# Patient Record
Sex: MALE | Race: WHITE | NOT HISPANIC OR LATINO | URBAN - METROPOLITAN AREA
[De-identification: names, ages, dates, MRNs, and addresses within clinical notes are randomized per-mention and may not be internally consistent; named-entity substitution may affect disease eponyms.]

---

## 2018-03-28 ENCOUNTER — EMERGENCY (EMERGENCY)
Facility: HOSPITAL | Age: 63
LOS: 1 days | Discharge: HOME | End: 2018-03-28
Attending: EMERGENCY MEDICINE
Payer: SELF-PAY

## 2018-03-28 VITALS
SYSTOLIC BLOOD PRESSURE: 129 MMHG | DIASTOLIC BLOOD PRESSURE: 69 MMHG | HEIGHT: 70 IN | TEMPERATURE: 98 F | HEART RATE: 72 BPM | OXYGEN SATURATION: 97 % | WEIGHT: 179.9 LBS | RESPIRATION RATE: 15 BRPM

## 2018-03-28 PROCEDURE — 99053 MED SERV 10PM-8AM 24 HR FAC: CPT

## 2018-03-28 PROCEDURE — 99285 EMERGENCY DEPT VISIT HI MDM: CPT

## 2018-03-28 PROCEDURE — 99284 EMERGENCY DEPT VISIT MOD MDM: CPT | Mod: 25

## 2018-03-28 RX ORDER — HALOPERIDOL DECANOATE 100 MG/ML
5 INJECTION INTRAMUSCULAR ONCE
Qty: 0 | Refills: 0 | Status: COMPLETED | OUTPATIENT
Start: 2018-03-28 | End: 2018-03-28

## 2018-03-28 NOTE — ED ADULT NURSE NOTE - ED STAT RN HANDOFF DETAILS
Pt endorsed to ALEXANDER Read in stable conditions. Pt not in any acute distress. Pt awaiting disposition.

## 2018-03-28 NOTE — ED ADULT NURSE NOTE - OBJECTIVE STATEMENT
Patient brought into the ED for alcohol intox. Changed into hospital and placed on safety precautions. Patient in no acute distress. No bruises or bleeding noted on the body.

## 2018-03-29 VITALS
SYSTOLIC BLOOD PRESSURE: 149 MMHG | DIASTOLIC BLOOD PRESSURE: 72 MMHG | HEART RATE: 86 BPM | OXYGEN SATURATION: 98 % | TEMPERATURE: 99 F | RESPIRATION RATE: 15 BRPM

## 2018-03-29 NOTE — ED PROVIDER NOTE - PROGRESS NOTE DETAILS
Pt awake alert ambulatory without difficulty. Refused SW. Reiterates no trauma, drugs, no hallucinations or dizziness. Educated on care.

## 2018-03-29 NOTE — ED PROVIDER NOTE - OBJECTIVE STATEMENT
63 y/o M with no significant PMHx BIB EMS to ED after [place]. EMS called by [facility] staff who was reporting concerning for EtOH intoxication. Pt denies any trauma, chest pain, shortness of breath, hallucinations or any other complaints. Also denies drug use. NKDA.

## 2018-03-29 NOTE — ED PROVIDER NOTE - MEDICAL DECISION MAKING DETAILS
63 y/o M here w/ suspected EtOH intoxication w/o signs of trauma, drugs, or psych issues. Will observe for neurological improvement in ED.

## 2023-03-29 NOTE — ED ADULT NURSE NOTE - ATTEMPT TO OOB
Mr. Lani Forge-  The labs that I repeated on your sodium level is improved. Dr. Fabiano Thomas will address the other labs that she ordered, which appear stable as well. Please let us know if you develop  any new or worsening sx.   Nidia no